# Patient Record
Sex: FEMALE | Race: WHITE | NOT HISPANIC OR LATINO | Employment: UNEMPLOYED | URBAN - NONMETROPOLITAN AREA
[De-identification: names, ages, dates, MRNs, and addresses within clinical notes are randomized per-mention and may not be internally consistent; named-entity substitution may affect disease eponyms.]

---

## 2022-10-19 ENCOUNTER — TRANSCRIBE ORDERS (OUTPATIENT)
Dept: PHYSICAL THERAPY | Facility: HOSPITAL | Age: 32
End: 2022-10-19

## 2022-10-19 DIAGNOSIS — N94.10 UNSPECIFIED DYSPAREUNIA (CODE): Primary | ICD-10-CM

## 2023-05-01 ENCOUNTER — OFFICE VISIT (OUTPATIENT)
Dept: FAMILY MEDICINE CLINIC | Facility: CLINIC | Age: 33
End: 2023-05-01
Payer: COMMERCIAL

## 2023-05-01 VITALS
SYSTOLIC BLOOD PRESSURE: 120 MMHG | BODY MASS INDEX: 27.82 KG/M2 | DIASTOLIC BLOOD PRESSURE: 78 MMHG | OXYGEN SATURATION: 99 % | HEART RATE: 87 BPM | WEIGHT: 157 LBS | HEIGHT: 63 IN

## 2023-05-01 DIAGNOSIS — F41.9 ANXIETY: ICD-10-CM

## 2023-05-01 DIAGNOSIS — Z76.89 ENCOUNTER TO ESTABLISH CARE: Primary | ICD-10-CM

## 2023-05-01 PROBLEM — R60.9 EDEMA: Status: ACTIVE | Noted: 2023-05-01

## 2023-05-01 PROBLEM — IMO0002 MATERNAL ANEMIA COMPLICATING PREGNANCY, CHILDBIRTH, OR THE PUERPERIUM: Status: ACTIVE | Noted: 2023-05-01

## 2023-05-01 PROBLEM — N94.6 DYSMENORRHEA: Status: ACTIVE | Noted: 2023-05-01

## 2023-05-01 PROBLEM — O26.859 SPOTTING DURING PREGNANCY: Status: ACTIVE | Noted: 2021-11-22

## 2023-05-01 PROBLEM — O03.9 MISCARRIAGE: Status: ACTIVE | Noted: 2020-12-31

## 2023-05-01 PROBLEM — Q25.6 PULMONARY ARTERY STENOSIS: Status: ACTIVE | Noted: 2021-11-22

## 2023-05-01 RX ORDER — BUPROPION HYDROCHLORIDE 150 MG/1
150 TABLET ORAL DAILY
Qty: 30 TABLET | Refills: 11 | Status: SHIPPED | OUTPATIENT
Start: 2023-05-01 | End: 2023-05-02

## 2023-05-01 NOTE — PROGRESS NOTES
"Chief Complaint  Establish Care    Subjective                Deirdre Harris presents to [facility] [place] to establish care. She is currently 10 months post partum and thinks she may have post partum anxiety due to increase anxiousness and intrusive thoughts about the baby getting hurt.       Anxiety  Presents for initial visit. Onset was 6 to 12 months ago. The problem has been waxing and waning. Symptoms include excessive worry and nervous/anxious behavior. The severity of symptoms is causing significant distress.     Past treatments include SSRIs and lifestyle changes.     No outpatient medications prior to visit.     No facility-administered medications prior to visit.       Review of Systems   Psychiatric/Behavioral: The patient is nervous/anxious.          Objective   Vital Signs:   Visit Vitals  /78 (BP Location: Left arm, Patient Position: Sitting, Cuff Size: Adult)   Pulse 87   Ht 160 cm (63\")   Wt 71.2 kg (157 lb)   LMP 04/01/2023 (Exact Date)   SpO2 99%   BMI 27.81 kg/m²     Physical Exam  Vitals and nursing note reviewed.   Constitutional:       Appearance: She is well-developed.   HENT:      Head: Normocephalic and atraumatic.   Eyes:      General: Lids are normal.      Conjunctiva/sclera: Conjunctivae normal.   Neck:      Thyroid: No thyroid mass or thyromegaly.      Trachea: Trachea normal. No tracheal tenderness.   Cardiovascular:      Rate and Rhythm: Normal rate.      Pulses: Normal pulses.      Heart sounds: Normal heart sounds.   Pulmonary:      Effort: Pulmonary effort is normal. No respiratory distress.      Breath sounds: Normal breath sounds. No wheezing.   Abdominal:      General: There is no distension.      Palpations: Abdomen is soft. There is no mass.   Musculoskeletal:         General: Normal range of motion.      Cervical back: Normal range of motion. No edema.   Skin:     General: Skin is warm and dry.      Coloration: Skin is not " pale.      Findings: No abrasion, erythema or lesion.   Neurological:      Mental Status: She is alert and oriented to person, place, and time.   Psychiatric:         Mood and Affect: Mood is anxious. Affect is not inappropriate.         Speech: Speech normal.         Behavior: Behavior normal.         Thought Content: Thought content normal.         Judgment: Judgment normal. Judgment is not impulsive.        Result Review :                 Assessment and Plan    Diagnoses and all orders for this visit:    1. Encounter to establish care (Primary)  -     TSH; Future  -     Vitamin D,25-Hydroxy; Future  -     Comprehensive Metabolic Panel; Future  -     Hemoglobin A1c; Future  -     CBC & Differential; Future  -     Vitamin B12; Future  -     Lipid Panel; Future  -     Hepatitis C Antibody; Future    2. Anxiety  -     buPROPion XL (Wellbutrin XL) 150 MG 24 hr tablet; Take 1 tablet by mouth Daily.  Dispense: 30 tablet; Refill: 11  -     TSH; Future  -     Vitamin D,25-Hydroxy; Future  -     Comprehensive Metabolic Panel; Future  -     Hemoglobin A1c; Future  -     CBC & Differential; Future  -     Vitamin B12; Future  -     Lipid Panel; Future  -     Hepatitis C Antibody; Future      We will start Wellbutrin 150 XL  Take daily  If you have issues with this medication please call the office   If you have thoughts of harming yourself or others please stop medication and go to Er     Complete ordered lab work   We will call with results  Pending lab results we will discuss further treatment plan and monitoring         Follow Up   Return if symptoms worsen or fail to improve.  Patient was given instructions and counseling regarding her condition or for health maintenance advice. Please see specific information pulled into the AVS if appropriate.           This document has been electronically signed by PATTI Lynch on May 1, 2023 14:48 CDT

## 2023-05-02 DIAGNOSIS — R63.5 WEIGHT GAIN: ICD-10-CM

## 2023-05-02 DIAGNOSIS — F41.9 ANXIETY: Primary | ICD-10-CM

## 2023-05-02 RX ORDER — BUPROPION HYDROCHLORIDE 300 MG/1
300 TABLET ORAL DAILY
Qty: 30 TABLET | Refills: 11 | Status: SHIPPED | OUTPATIENT
Start: 2023-05-02

## 2023-05-05 ENCOUNTER — DOCUMENTATION (OUTPATIENT)
Dept: FAMILY MEDICINE CLINIC | Facility: CLINIC | Age: 33
End: 2023-05-05
Payer: COMMERCIAL

## 2023-05-15 DIAGNOSIS — M62.89 PELVIC FLOOR DYSFUNCTION IN FEMALE: Primary | ICD-10-CM

## 2023-05-30 ENCOUNTER — LAB (OUTPATIENT)
Dept: LAB | Facility: HOSPITAL | Age: 33
End: 2023-05-30

## 2023-05-30 DIAGNOSIS — F41.9 ANXIETY: ICD-10-CM

## 2023-05-30 DIAGNOSIS — Z76.89 ENCOUNTER TO ESTABLISH CARE: ICD-10-CM

## 2023-05-30 PROCEDURE — 86803 HEPATITIS C AB TEST: CPT

## 2023-05-30 PROCEDURE — 84443 ASSAY THYROID STIM HORMONE: CPT

## 2023-05-30 PROCEDURE — 83036 HEMOGLOBIN GLYCOSYLATED A1C: CPT

## 2023-05-30 PROCEDURE — 82607 VITAMIN B-12: CPT

## 2023-05-30 PROCEDURE — 36415 COLL VENOUS BLD VENIPUNCTURE: CPT

## 2023-05-30 PROCEDURE — 80053 COMPREHEN METABOLIC PANEL: CPT

## 2023-05-30 PROCEDURE — 80061 LIPID PANEL: CPT

## 2023-05-30 PROCEDURE — 85025 COMPLETE CBC W/AUTO DIFF WBC: CPT

## 2023-05-30 PROCEDURE — 82306 VITAMIN D 25 HYDROXY: CPT

## 2023-05-31 DIAGNOSIS — R63.5 WEIGHT GAIN: ICD-10-CM

## 2023-05-31 DIAGNOSIS — Z76.89 ENCOUNTER TO ESTABLISH CARE: Primary | ICD-10-CM

## 2023-05-31 LAB
25(OH)D3 SERPL-MCNC: 49.9 NG/ML (ref 30–100)
ALBUMIN SERPL-MCNC: 4.3 G/DL (ref 3.5–5.2)
ALBUMIN/GLOB SERPL: 1.5 G/DL
ALP SERPL-CCNC: 56 U/L (ref 39–117)
ALT SERPL W P-5'-P-CCNC: 12 U/L (ref 1–33)
ANION GAP SERPL CALCULATED.3IONS-SCNC: 11.9 MMOL/L (ref 5–15)
AST SERPL-CCNC: 19 U/L (ref 1–32)
BASOPHILS # BLD AUTO: 0.05 10*3/MM3 (ref 0–0.2)
BASOPHILS NFR BLD AUTO: 0.8 % (ref 0–1.5)
BILIRUB SERPL-MCNC: <0.2 MG/DL (ref 0–1.2)
BUN SERPL-MCNC: 16 MG/DL (ref 6–20)
BUN/CREAT SERPL: 21.9 (ref 7–25)
CALCIUM SPEC-SCNC: 9.6 MG/DL (ref 8.6–10.5)
CHLORIDE SERPL-SCNC: 104 MMOL/L (ref 98–107)
CHOLEST SERPL-MCNC: 177 MG/DL (ref 0–200)
CO2 SERPL-SCNC: 25.1 MMOL/L (ref 22–29)
CREAT SERPL-MCNC: 0.73 MG/DL (ref 0.57–1)
DEPRECATED RDW RBC AUTO: 39.6 FL (ref 37–54)
EGFRCR SERPLBLD CKD-EPI 2021: 110.8 ML/MIN/1.73
EOSINOPHIL # BLD AUTO: 0.11 10*3/MM3 (ref 0–0.4)
EOSINOPHIL NFR BLD AUTO: 1.8 % (ref 0.3–6.2)
ERYTHROCYTE [DISTWIDTH] IN BLOOD BY AUTOMATED COUNT: 12.2 % (ref 12.3–15.4)
GLOBULIN UR ELPH-MCNC: 2.9 GM/DL
GLUCOSE SERPL-MCNC: 82 MG/DL (ref 65–99)
HBA1C MFR BLD: 5.3 % (ref 4.8–5.6)
HCT VFR BLD AUTO: 39.4 % (ref 34–46.6)
HCV AB SER DONR QL: NORMAL
HDLC SERPL-MCNC: 71 MG/DL (ref 40–60)
HGB BLD-MCNC: 13.3 G/DL (ref 12–15.9)
IMM GRANULOCYTES # BLD AUTO: 0.01 10*3/MM3 (ref 0–0.05)
IMM GRANULOCYTES NFR BLD AUTO: 0.2 % (ref 0–0.5)
LDLC SERPL CALC-MCNC: 94 MG/DL (ref 0–100)
LDLC/HDLC SERPL: 1.31 {RATIO}
LYMPHOCYTES # BLD AUTO: 1.79 10*3/MM3 (ref 0.7–3.1)
LYMPHOCYTES NFR BLD AUTO: 28.9 % (ref 19.6–45.3)
MCH RBC QN AUTO: 30.3 PG (ref 26.6–33)
MCHC RBC AUTO-ENTMCNC: 33.8 G/DL (ref 31.5–35.7)
MCV RBC AUTO: 89.7 FL (ref 79–97)
MONOCYTES # BLD AUTO: 0.36 10*3/MM3 (ref 0.1–0.9)
MONOCYTES NFR BLD AUTO: 5.8 % (ref 5–12)
NEUTROPHILS NFR BLD AUTO: 3.88 10*3/MM3 (ref 1.7–7)
NEUTROPHILS NFR BLD AUTO: 62.5 % (ref 42.7–76)
NRBC BLD AUTO-RTO: 0 /100 WBC (ref 0–0.2)
PLATELET # BLD AUTO: 273 10*3/MM3 (ref 140–450)
PMV BLD AUTO: 11.2 FL (ref 6–12)
POTASSIUM SERPL-SCNC: 4.8 MMOL/L (ref 3.5–5.2)
PROT SERPL-MCNC: 7.2 G/DL (ref 6–8.5)
RBC # BLD AUTO: 4.39 10*6/MM3 (ref 3.77–5.28)
SODIUM SERPL-SCNC: 141 MMOL/L (ref 136–145)
TRIGL SERPL-MCNC: 65 MG/DL (ref 0–150)
TSH SERPL DL<=0.05 MIU/L-ACNC: 4.61 UIU/ML (ref 0.27–4.2)
VIT B12 BLD-MCNC: 469 PG/ML (ref 211–946)
VLDLC SERPL-MCNC: 12 MG/DL (ref 5–40)
WBC NRBC COR # BLD: 6.2 10*3/MM3 (ref 3.4–10.8)

## 2023-06-12 ENCOUNTER — HOSPITAL ENCOUNTER (OUTPATIENT)
Dept: PHYSICAL THERAPY | Facility: HOSPITAL | Age: 33
Setting detail: THERAPIES SERIES
Discharge: HOME | End: 2023-06-12
Payer: COMMERCIAL

## 2023-06-12 DIAGNOSIS — M62.89 PELVIC FLOOR DYSFUNCTION IN FEMALE: Primary | ICD-10-CM

## 2023-06-12 DIAGNOSIS — R10.2 PELVIC PAIN: ICD-10-CM

## 2023-06-12 PROCEDURE — 97162 PT EVAL MOD COMPLEX 30 MIN: CPT | Performed by: PHYSICAL THERAPIST

## 2023-06-12 NOTE — THERAPY EVALUATION
Outpatient Physical Therapy Pelvic Health Initial Evaluation  [facility]     Patient Name: Deirdre Harris  : 1990  MRN: [id number]  Today's Date: 2023        Visit Date: 2023  Visit number:   Recheck: 23  Insurance: precert post eval      Patient Active Problem List   Diagnosis    Miscarriage    Dysmenorrhea    Edema    Maternal anemia complicating pregnancy, childbirth, or the puerperium    Pulmonary artery stenosis    Spotting during pregnancy        History reviewed. No pertinent past medical history.     History reviewed. No pertinent surgical history.      Visit Dx:    ICD-10-CM ICD-9-CM   1. Pelvic floor dysfunction in female  M62.89 618.83   2. Pelvic pain  R10.2 [id number]            Pelvic Health       Row Name 23 1600             Pregnancy Questions    Number of Pregnancies 2  -SW      Number of Miscarriages 1  -SW      Number of Children 1  almost one  -SW      Type of Previous Deliveries Vaginal  small tear  -SW         Pain Assessment    Pain Assessment 0-10  -SW      Pain Score 1  -SW      Post Pain Score 1  -SW         Lumbar/SI Special Tests    Lumbar/SI Special Tests Comments lumbar spine assessment deferred this visit.  -SW         Lumbosacral Palpation    Pelvic Floor --  see pelvic floor assessment section  -SW         Pelvic Floor Muscle    Patient/Parent/Guardian Consented to Internal Pelvic Floor Exam Yes  -SW      Strength (Right) 4: Strong contraction  -SW      Strength (Left) 4: Strong contraction  -SW      Symmetry of Sustained Maximal Contraction Symmetrical  -SW      Internal Pelvic Floor Comments vaginal os tender along L side of entrance.  No restriction with perineal body mobility R and L directions.  Superior mobiltiy and inferior also with good movements without adhesions.  MMT 4/5 with good activation with isolation. Able to pair with breathing cycle.  Good elevation with contraction.  No evidence of POP present.  Obt internus + R and ++ L with  additions of ischiocavernosus on L side.  Alcock's canal negative.  -SW      External Pelvic Floor Comments external perineum intact without gapping.  Tissue appears with low pigmentation along vaginal os.  Diaphragmatic breathing good - with coordination for PF ability noted.  -SW         Observations    Perineal Observation Performed? Yes  -SW         Observation of Contraction in Perineum    Anal [place] Present  -SW      Perineal Body Lift Present  -SW         Pelvic Floor    Ability to Isolate Contraction of Pelvic Floor Yes  -SW         Prolapse (Pop-Q)    Prolapse Comments no evidence of POP noted.  -SW         SEMG Evaluation    SEMG Evaluation Comments deferred  -SW         Education Provided On:    Education Points Self-Mobilization of soft tissue;HEP  use of massage trigger tool and stretching  -SW      Method of Delivery Verbal;Demonstration;Written  -SW      Education Provided To Patient  -SW      Level of Understanding Teach back education performed;Verbalized;Demonstrated  -SW                User Key  (r) = Recorded By, (t) = Taken By, (c) = Cosigned By      Initials Name Provider Type    Dori Escamilla, CHACHA DPT Physical Therapist                   PT Ortho       Row Name 06/12/23 1600       Subjective Comments    Subjective Comments Pt reports this date s/p spontaneous vaginal delivery approx 10 months prior resulting in stage II tear to perineum. Patient reports signficant discomfort to perineum but felt it would require time for healing.  She even had pain with wiping.  Seam of pants was no bother.  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  Had a fall down stairs while holding infant around 10 weeks post partum.  This seemed to magnify her already present pelvic pain.  Pain was localized to the coccyx directly.  She was certain she fractured it, but never went to MD as she knew there wasn't much they would do.  Pt struggled to sit directly on bottom due to  intense pain. She has since purchased a pillow to use in chair at work to assist with comfort.  Since that time, pain has been reduced.  She denies pain with elimination or evacuation processes despite significant hemorrhoid present.  Pain has not changed much with time since delivery. Pt describes pain as that associated with scar movement.   Wiping causes discomfort.   She reports always having feeling of dryness with intercourse.  Lubrication helps, but still feels sensitive and tight with burning like sensation.  She is able to orgasm and reports no pain with external stimulation.  Spot directly to L of vaginal opening has been there a while, but not really concerned as it has been problematic.  Mariloulouff about a 1 at least 95% of time.  Pain lingers following intercourse.  Pain always with initial penetration.  It just feels it needs to stretch. Still pain with use of lubrication.  Any direct contact to area causes pain.  Seems to be internal compared to external region of discomfort. I want to return to intercourse.  I really enjoyed that part of our marriage.  Now I am not so excited with participation.  -SW       Subjective Pain    Able to rate subjective pain? yes  -SW    Pre-Treatment Pain Level 1  -SW    Post-Treatment Pain Level 1  -SW       Posture/Observations    Posture- WNL Posture is WNL  -SW    Posture/Observations Comments patient is alert and oriented. Good historian of medical events leading up to this concern.  Escorted to therapy by significant other/ Augustus who remains present for only the subjective history of examination.  Pt with normal gait without deviation.  No AD used and I with all transfers and don/doff clothing.  Pt appears comfortable with seated postures without distress.  -SW       General ROM    GENERAL ROM COMMENTS functional ROM thruoghout LS and LE to allow for examination and mobility  -SW       MMT (Manual Muscle Testing)    General MMT Comments functional for I  transfers.  -              User Key  (r) = Recorded By, (t) = Taken By, (c) = Cosigned By      Initials Name Provider Type    Dori Escamilla, PT DPT Physical Therapist                                PT Assessment/Plan       Row Name 23 1700          PT Assessment    Functional Limitations Performance in leisure activities;Performance in self-care ADL  -     Impairments Impaired muscle length;Impaired muscle endurance;Impaired muscle power;Pain  -     Assessment Comments Patient is a 33 yo female presenting to clinic with c/o of pelvic pain associated with intercourse following .  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  She would benfit from skilled PT intervention to address facial mobiliyy, scar massage and uptraining to PF to assist with function without pain.  -     Rehab Potential Good  -     Patient/caregiver participated in establishment of treatment plan and goals Yes  -SW     Patient would benefit from skilled therapy intervention Yes  -SW        PT Plan    PT Frequency 1x/week  -     Predicted Duration of Therapy Intervention (PT) 8 visits  -     Planned CPT's? PT EVAL MOD COMPLELITY: 73313;PT RE-EVAL: 44271;PT THER PROC EA 15 MIN: 35327;PT MANUAL THERAPY EA 15 MIN: 78788;PT THER ACT EA 15 MIN: 41142;PT NEUROMUSC RE-EDUCATION EA 15 MIN: 61723;PT SELF CARE/HOME MGMT/TRAIN EA 15: 82895;PT ELECTRICAL STIM UNATTEND:   -     PT Plan Comments HEP consistent with stretching, breathing exercises, stabilization, manual fascial release as needed  -               User Key  (r) = Recorded By, (t) = Taken By, (c) = Cosigned By      Initials Name Provider Type    Dori Escamilla, PT DPT Physical Therapist                       OP Exercises       Row Name 23 1600             Subjective Comments    Subjective Comments Pt reports this date s/p spontaneous vaginal delivery approx 10 months prior  resulting in stage II tear to perineum. Patient reports signficant discomfort to perineum but felt it would require time for healing.  She even had pain with wiping.  Seam of pants was no bother.  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  Had a fall down stairs while holding infant around 10 weeks post partum.  This seemed to magnify her already present pelvic pain.  Pain was localized to the coccyx directly.  She was certain she fractured it, but never went to MD as she knew there wasn't much they would do.  Pt struggled to sit directly on bottom due to intense pain. She has since purchased a pillow to use in chair at work to assist with comfort.  Since that time, pain has been reduced.  She denies pain with elimination or evacuation processes despite significant hemorrhoid present.  Pain has not changed much with time since delivery. Pt describes pain as that associated with scar movement.   Wiping causes discomfort.   She reports always having feeling of dryness with intercourse.  Lubrication helps, but still feels sensitive and tight with burning like sensation.  She is able to orgasm and reports no pain with external stimulation.  Spot directly to L of vaginal opening has been there a while, but not really concerned as it has been problematic.  Marinoff about a 1 at least 95% of time.  Pain lingers following intercourse.  Pain always with initial penetration.  It just feels it needs to stretch. Still pain with use of lubrication.  Any direct contact to area causes pain.  Seems to be internal compared to external region of discomfort. I want to return to intercourse.  I really enjoyed that part of our marriage.  Now I am not so excited with participation.  -SW         Subjective Pain    Able to rate subjective pain? yes  -SW      Pre-Treatment Pain Level 1  -SW      Post-Treatment Pain Level 1  -SW         Exercise 1    Exercise Name 1 SKTC stretch  -SW      Reps 1 3  -SW       Time 1 30 sec  -SW         Exercise 2    Exercise Name 2 piriformis stretch  -SW      Reps 2 3  -SW      Time 2 30 sec  -SW      Additional Comments supine and standing  -SW         Exercise 3    Exercise Name 3 happy baby pose  -SW      Reps 3 3  -SW      Time 3 30 sec  -SW         Exercise 4    Exercise Name 4 frog stretch  -SW      Reps 4 3  -SW      Time 4 30 sec  -SW                User Key  (r) = Recorded By, (t) = Taken By, (c) = Cosigned By      Initials Name Provider Type    Dori Escamilla, PT DPT Physical Therapist                  Manual Rx (last 36 hours)       Manual Treatments       Row Name 06/12/23 1700             Manual Rx 1    Manual Rx 1 Location obt internus release bilaterally  -SW                User Key  (r) = Recorded By, (t) = Taken By, (c) = Cosigned By      Initials Name Provider Type    Dori Escamilla, PT DPT Physical Therapist                               PT OP Goals       Row Name 06/12/23 1700          PT Short Term Goals    STG Date to Achieve 07/11/23  -     STG 1 patient to be compliant with 10 minutes diaphragmatic breathing to assist with dec anxiety and improve pelvic coordination with pelvic diaphragm  -     STG 1 Progress New  [facility]     STG 2 patient to tolerate massage tool x 10 minutes without inc pain in its use for trigger release up to 3x/week.  -     STG 2 Progress New  [facility]     STG 3 patient to show normalize resting tone to PF of 2.9mv or less  -     STG 3 Progress New  [facility]     STG 4 patient to show isolated PF contractions with full return to baseline 10/10 times in supine position  -     STG 4 Progress New  [facility]     STG 5 patient to report with less taut bands to obt internus muscle bilaterally such that palpation elicits min to no tenderness.  -     STG 5 Progress [facility]        Long Term Goals    LTG Date to Achieve 10/27/23  -     LTG 1 subjectively improve tolerance to intercourse at Marinoff scale of 0  -     LTG 1 Progress [facility]      LTG 2 normal SEMG in seated and standing postures to identify contraction and release without delay or active assist of surrounding area.  -     LTG 2 Progress New  -     LTG 3 Subjective improvement of pain by 0-1 at all times or mild at very highest during intercourse that easily subsides following intercourse  -SW     LTG 3 Progress New  -     LTG 4 NIH-CPSI score of 4 or less indicating min dysfunction noted with PF health.  -     LTG 4 Progress New  -        Time Calculation    PT Goal Re-Cert Due Date 07/11/23  -               User Key  (r) = Recorded By, (t) = Taken By, (c) = Cosigned By      Initials Name Provider Type    Dori Escamilla, PT DPT Physical Therapist                    Therapy Education  Given: HEP, Symptoms/condition management  Program: New  How Provided: Verbal, Demonstration, Written  Provided to: Patient  Level of Understanding: Teach back education performed, Verbalized, Demonstrated               Time Calculation:   Start Time: 1600  Stop Time: 1706  Time Calculation (min): 66 min              Dori Osman, PT DPT  6/12/2023